# Patient Record
Sex: FEMALE | Race: WHITE | Employment: OTHER | URBAN - METROPOLITAN AREA
[De-identification: names, ages, dates, MRNs, and addresses within clinical notes are randomized per-mention and may not be internally consistent; named-entity substitution may affect disease eponyms.]

---

## 2017-01-03 ENCOUNTER — APPOINTMENT (OUTPATIENT)
Dept: LAB | Age: 67
End: 2017-01-03
Payer: MEDICARE

## 2017-01-03 DIAGNOSIS — Z01.818 PREOP EXAMINATION: ICD-10-CM

## 2017-01-03 LAB
ATRIAL RATE: 84 BPM
BUN BLD-MCNC: 17 MG/DL (ref 8–20)
CALCIUM BLD-MCNC: 9.1 MG/DL (ref 8.3–10.3)
CHLORIDE: 105 MMOL/L (ref 101–111)
CO2: 28 MMOL/L (ref 22–32)
CREAT BLD-MCNC: 0.75 MG/DL (ref 0.55–1.02)
GLUCOSE BLD-MCNC: 87 MG/DL (ref 70–99)
P AXIS: 76 DEGREES
P-R INTERVAL: 162 MS
POTASSIUM SERPL-SCNC: 4.1 MMOL/L (ref 3.6–5.1)
Q-T INTERVAL: 408 MS
QRS DURATION: 84 MS
QTC CALCULATION (BEZET): 482 MS
R AXIS: 53 DEGREES
SODIUM SERPL-SCNC: 140 MMOL/L (ref 136–144)
T AXIS: 64 DEGREES
VENTRICULAR RATE: 84 BPM

## 2017-01-03 PROCEDURE — 80048 BASIC METABOLIC PNL TOTAL CA: CPT

## 2017-01-03 PROCEDURE — 93010 ELECTROCARDIOGRAM REPORT: CPT | Performed by: INTERNAL MEDICINE

## 2017-01-03 PROCEDURE — 93005 ELECTROCARDIOGRAM TRACING: CPT

## 2017-01-03 PROCEDURE — 36415 COLL VENOUS BLD VENIPUNCTURE: CPT

## 2017-01-24 ENCOUNTER — ANESTHESIA (OUTPATIENT)
Dept: SURGERY | Facility: HOSPITAL | Age: 67
End: 2017-01-24
Payer: MEDICARE

## 2017-01-24 ENCOUNTER — ANESTHESIA EVENT (OUTPATIENT)
Dept: SURGERY | Facility: HOSPITAL | Age: 67
End: 2017-01-24
Payer: MEDICARE

## 2017-01-24 ENCOUNTER — SURGERY (OUTPATIENT)
Age: 67
End: 2017-01-24

## 2017-01-24 NOTE — ANESTHESIA POSTPROCEDURE EVALUATION
[facility] Patient Status:  Hospital Outpatient Surgery   Age/Gender 73year old female MRN [id number]   [facility] SURGERY Attending Cyndie Curling, MD   Hosp Day # 0 PCP ANGELA PRITCHETT       Anesthesia Post-op Note

## 2017-01-24 NOTE — ANESTHESIA PREPROCEDURE EVALUATION
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

## 2017-02-13 PROBLEM — D14.0 INVERTED PAPILLOMA OF LATERAL NASAL WALL: Status: ACTIVE | Noted: 2017-02-13
